# Patient Record
Sex: MALE | Race: WHITE | NOT HISPANIC OR LATINO | URBAN - METROPOLITAN AREA
[De-identification: names, ages, dates, MRNs, and addresses within clinical notes are randomized per-mention and may not be internally consistent; named-entity substitution may affect disease eponyms.]

---
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
## 2024-02-05 ENCOUNTER — TELEMEDICINE (OUTPATIENT)
Dept: PRIMARY CARE | Facility: CLINIC | Age: 19
End: 2024-02-05
Payer: COMMERCIAL

## 2024-02-05 DIAGNOSIS — J01.90 ACUTE SINUSITIS, RECURRENCE NOT SPECIFIED, UNSPECIFIED LOCATION: Primary | ICD-10-CM

## 2024-02-05 PROCEDURE — 99442 PR PHYS/QHP TELEPHONE EVALUATION 11-20 MIN: CPT | Performed by: FAMILY MEDICINE

## 2024-02-05 ASSESSMENT — PAIN SCALES - GENERAL: PAINLEVEL: 3

## 2024-02-05 ASSESSMENT — PATIENT HEALTH QUESTIONNAIRE - PHQ9
2. FEELING DOWN, DEPRESSED OR HOPELESS: NOT AT ALL
SUM OF ALL RESPONSES TO PHQ9 QUESTIONS 1 AND 2: 0
1. LITTLE INTEREST OR PLEASURE IN DOING THINGS: NOT AT ALL

## 2024-02-05 NOTE — PROGRESS NOTES
Pt states has congestion, coughing up yellow phelm. Pt states has cough,running nose. Slight headach.

## 2024-02-05 NOTE — PROGRESS NOTES
"This is a 18 year old male for TELE VISIT for URI Issues which he believes to be SINUSITIS and TELE is attempted with him but he is in [place]   His VM on cell is full and mother at home states he needs the 1 PM visit time so I will oblige this.    REACHED PATIENT in afternoon:    States \"I could be doing better ; having congestion in throat and nose and cough with phlegm\" and COUGHING during visit    Sxs duration since Friday (today is MONDAY    Has been immunized for COVID with boosters    COULD BE COVID, RSV, or FLU or STREP; other    HE IS ADV to be seen at [facility] or other facility and he agrees.              "

## 2024-03-22 ENCOUNTER — OFFICE VISIT (OUTPATIENT)
Dept: PRIMARY CARE | Facility: CLINIC | Age: 19
End: 2024-03-22
Payer: COMMERCIAL

## 2024-03-22 VITALS
BODY MASS INDEX: 18.49 KG/M2 | DIASTOLIC BLOOD PRESSURE: 84 MMHG | WEIGHT: 109.4 LBS | TEMPERATURE: 97.5 F | SYSTOLIC BLOOD PRESSURE: 122 MMHG | OXYGEN SATURATION: 96 % | HEART RATE: 83 BPM

## 2024-03-22 DIAGNOSIS — F98.8 ATTENTION DEFICIT DISORDER, UNSPECIFIED HYPERACTIVITY PRESENCE: ICD-10-CM

## 2024-03-22 DIAGNOSIS — Z00.00 ROUTINE MEDICAL EXAM: Primary | ICD-10-CM

## 2024-03-22 DIAGNOSIS — Z71.9 HEALTH COUNSELING: ICD-10-CM

## 2024-03-22 DIAGNOSIS — Z13.9 SCREENING DUE: ICD-10-CM

## 2024-03-22 PROCEDURE — 99395 PREV VISIT EST AGE 18-39: CPT | Performed by: FAMILY MEDICINE

## 2024-03-22 PROCEDURE — 1036F TOBACCO NON-USER: CPT | Performed by: FAMILY MEDICINE

## 2024-03-22 ASSESSMENT — PATIENT HEALTH QUESTIONNAIRE - PHQ9
SUM OF ALL RESPONSES TO PHQ9 QUESTIONS 1 AND 2: 0
2. FEELING DOWN, DEPRESSED OR HOPELESS: NOT AT ALL
1. LITTLE INTEREST OR PLEASURE IN DOING THINGS: NOT AT ALL

## 2024-03-22 NOTE — PROGRESS NOTES
"This is a 18 year old male for IED form for [employer] for PHYSICAL and CARE GAP UPDATES are preformed with labs as well and has ADD DISORDER with LEVEL of SEVERITY SEVERE per parent report but was in care of Dr Rehman PEDIATRICIAN and no recent adult evaluation for ADD BUT DECLINES adult Psych EVAL as HE and ATTENDING MOTHER decline possible meds (declines MEDS) and wish ATMOSPHERE ACCOMMODATION.    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 HAS NORMAL OCCIPITAL KNOB.      ROS is NEG for HEADACHE, NAUSEA, VOMITING, DIARRHEA, CHEST PAIN, SOB, and BLEEDING and as further REVIEWED BELOW.    Subjective   Reynaldo Haji is a 18 y.o. male who presents for No chief complaint on file..    HPI:    Per nursing intake, pt here for No chief complaint on file.       Review of systems is essentially negative for all systems except for any identified issues in HPI above.    Objective     There were no vitals taken for this visit.     Physical Examination:       GENERAL           General Appearance: well-appearing, well-developed, well-hydrated, well-nourished, no acute distress.        HEENT           NECK supple, no masses or thyromegaly, no carotid bruit.        EYES           Extraocular Movements: normal, bilateral eyes VERNELL, no conjunctival injection.        HEART           Rate and Rhythm regular rate and rhythm. Heart sounds: normal S1S2, no S3 or S4. Murmurs: none.        CHEST           Breath sounds: Clear to IPPA, RR<16 no use of accessory muscles.        ABDOMEN           General: Neg for LKKS or masses, no scleral icterus or jaundice.        MUSCULOSKELETAL           Joints Demonstration: Neg for erythema, swelling or joint deformities. gross abnormalities no gross abnormalities.        EXTREMITIES           Lower Extremities: Neg for cyanosis, clubbing or edema.       Assessment/Plan     This is a 18 year old male for IED form for [employer] for PHYSICAL and CARE GAP " UPDATES are preformed with labs as well and has ADD DISORDER with LEVEL of SEVERITY SEVERE per parent report but was in care of Dr Rehman PEDIATRICIAN and no recent adult evaluation for ADD BUT DECLINES adult Psych EVAL as HE and ATTENDING MOTHER decline possible meds (declines MEDS) and wish ATMOSPHERE ACCOMMODATION.    NOTE:      March 22, 2024  To whom it may concern:  This note is to respectfully request that this patient be granted STUDENT ACCESSIBILITY SERVICES to accommodate his MODERATE to SEVERE ADD tendencies which are exacerbated in uncontrolled environments.    SPECIAL HOUSING is requested with PRIVATE SINGLE ROOM and PRIVATE BATHROOM at [facility]     ALSO ACADEMIC accommodation as already requested is endorsed by Lis Lopez MD  Problem List Items Addressed This Visit    None      FOLLOW UP:  PRN and as specified above         Lis Lopez M.D.

## 2024-03-22 NOTE — PATIENT INSTRUCTIONS
March 22, 2024  To whom it may concern:  This note is to respectfully request that this patient be granted STUDENT ACCESSIBILITY SERVICES to accommodate his MODERATE to SEVERE ADD tendencies which are exacerbated in uncontrolled environments.    SPECIAL HOUSING is requested with PRIVATE SINGLE ROOM and PRIVATE BATHROOM at [facility]     ALSO ACADEMIC accommodation as already requested is endorsed by Lis Lopez MD